# Patient Record
Sex: MALE | URBAN - METROPOLITAN AREA
[De-identification: names, ages, dates, MRNs, and addresses within clinical notes are randomized per-mention and may not be internally consistent; named-entity substitution may affect disease eponyms.]

---

## 2022-03-14 ENCOUNTER — TELEPHONE (OUTPATIENT)
Dept: CARDIOLOGY | Facility: MEDICAL CENTER | Age: 67
End: 2022-03-14

## 2022-03-14 NOTE — TELEPHONE ENCOUNTER
LVM asking if patient has seen another Cardiologist in the past or had any cardiac testing done outside of [facility] to call with information so that records can be requested prior to appointment. Scheduled with Dr. Valle on 3/28/2022.

## 2022-03-14 NOTE — TELEPHONE ENCOUNTER
Called [facility]    # [phone]  Faxed STAT records request to [phone]

## 2022-03-15 NOTE — TELEPHONE ENCOUNTER
Prior Cardiology is only through [facility] but there was an Echo done through [facility] in [facility].

## 2022-03-16 ENCOUNTER — TELEPHONE (OUTPATIENT)
Dept: CARDIOLOGY | Facility: MEDICAL CENTER | Age: 67
End: 2022-03-16

## 2022-03-16 NOTE — TELEPHONE ENCOUNTER
Chart prep:    Faxed STAT request for echo report to [facility] in [place].     # [phone]  Fax# [phone]